# Patient Record
Sex: FEMALE | Race: ASIAN | NOT HISPANIC OR LATINO | Employment: FULL TIME | URBAN - METROPOLITAN AREA
[De-identification: names, ages, dates, MRNs, and addresses within clinical notes are randomized per-mention and may not be internally consistent; named-entity substitution may affect disease eponyms.]

---

## 2020-06-23 ENCOUNTER — TELEPHONE (OUTPATIENT)
Dept: OBGYN CLINIC | Facility: CLINIC | Age: 60
End: 2020-06-23

## 2020-06-24 ENCOUNTER — OFFICE VISIT (OUTPATIENT)
Dept: OBGYN CLINIC | Facility: CLINIC | Age: 60
End: 2020-06-24
Payer: COMMERCIAL

## 2020-06-24 VITALS
TEMPERATURE: 97.6 F | SYSTOLIC BLOOD PRESSURE: 120 MMHG | WEIGHT: 147.6 LBS | HEIGHT: 59 IN | BODY MASS INDEX: 29.76 KG/M2 | DIASTOLIC BLOOD PRESSURE: 70 MMHG

## 2020-06-24 DIAGNOSIS — N94.10 DYSPAREUNIA IN FEMALE: ICD-10-CM

## 2020-06-24 DIAGNOSIS — N85.2 UTERINE ENLARGEMENT: ICD-10-CM

## 2020-06-24 DIAGNOSIS — E58 DIETARY CALCIUM DEFICIENCY: ICD-10-CM

## 2020-06-24 DIAGNOSIS — Z12.4 PAP SMEAR FOR CERVICAL CANCER SCREENING: ICD-10-CM

## 2020-06-24 DIAGNOSIS — Z12.31 VISIT FOR SCREENING MAMMOGRAM: ICD-10-CM

## 2020-06-24 DIAGNOSIS — N95.2 ATROPHIC VAGINITIS: ICD-10-CM

## 2020-06-24 DIAGNOSIS — Z01.419 ENCOUNTER FOR ANNUAL ROUTINE GYNECOLOGICAL EXAMINATION: Primary | ICD-10-CM

## 2020-06-24 DIAGNOSIS — Z12.11 COLON CANCER SCREENING: ICD-10-CM

## 2020-06-24 PROBLEM — E03.9 HYPOTHYROIDISM: Status: ACTIVE | Noted: 2020-06-24

## 2020-06-24 PROBLEM — E78.5 HYPERLIPIDEMIA: Status: ACTIVE | Noted: 2020-06-24

## 2020-06-24 PROBLEM — J30.2 SEASONAL ALLERGIES: Status: ACTIVE | Noted: 2020-06-24

## 2020-06-24 PROCEDURE — S0610 ANNUAL GYNECOLOGICAL EXAMINA: HCPCS | Performed by: OBSTETRICS & GYNECOLOGY

## 2020-06-24 RX ORDER — LORATADINE 10 MG/1
10 TABLET ORAL DAILY
COMMUNITY

## 2020-06-24 RX ORDER — LEVOTHYROXINE SODIUM 0.1 MG/1
100 TABLET ORAL DAILY
COMMUNITY
Start: 2020-03-23

## 2020-07-07 ENCOUNTER — HOSPITAL ENCOUNTER (OUTPATIENT)
Dept: ULTRASOUND IMAGING | Facility: MEDICAL CENTER | Age: 60
Discharge: HOME | End: 2020-07-07
Payer: COMMERCIAL

## 2020-07-07 DIAGNOSIS — N85.2 UTERINE ENLARGEMENT: ICD-10-CM

## 2020-07-07 PROCEDURE — 76856 US EXAM PELVIC COMPLETE: CPT

## 2020-07-07 PROCEDURE — 76830 TRANSVAGINAL US NON-OB: CPT

## 2020-09-22 ENCOUNTER — OFFICE VISIT (OUTPATIENT)
Dept: OBGYN CLINIC | Facility: CLINIC | Age: 60
End: 2020-09-22
Payer: COMMERCIAL

## 2020-09-22 VITALS
HEART RATE: 62 BPM | SYSTOLIC BLOOD PRESSURE: 106 MMHG | WEIGHT: 148 LBS | BODY MASS INDEX: 29.89 KG/M2 | TEMPERATURE: 97.8 F | DIASTOLIC BLOOD PRESSURE: 68 MMHG | OXYGEN SATURATION: 98 %

## 2020-09-22 DIAGNOSIS — N95.2 ATROPHIC VAGINITIS: Primary | ICD-10-CM

## 2020-09-22 DIAGNOSIS — N94.10 DYSPAREUNIA IN FEMALE: ICD-10-CM

## 2020-09-22 PROCEDURE — 99213 OFFICE O/P EST LOW 20 MIN: CPT | Performed by: OBSTETRICS & GYNECOLOGY

## 2020-09-22 RX ORDER — SIMVASTATIN 20 MG
20 TABLET ORAL DAILY
COMMUNITY
Start: 2020-09-16

## 2020-09-22 NOTE — PROGRESS NOTES
Patient is a 60 y o  [id number] with No LMP recorded (lmp unknown)  Patient is postmenopausal  who presents in follow up to initiation of premarin vaginal cream for atrophic vaginitis and dyspareunia  Pt reports she is very happy with the outcome of the medication  She reports intercourse is painless 70% of the time and minimally painful the other 30%  She occasionally forgets to use it, but otherwise is doing well  She denies any vaginal bleeding, abnormal vaginal discharge  She would like to continue therapy  Past Medical History:   Diagnosis Date    Fibroid 2020    noted on u/s 6 cm fundal fibroid and another 2-3 cm intramural fibroid        Past Surgical History:   Procedure Laterality Date     SECTION      x2, 1993, 2001    DILATION AND CURETTAGE, DIAGNOSTIC / THERAPEUTIC      spontaneous ,     WISDOM TOOTH EXTRACTION         OB History    Para Term  AB Living   3 2 2   1 2   SAB TAB Ectopic Multiple Live Births                  # Outcome Date GA Lbr Derek/2nd Weight Sex Delivery Anes PTL Lv   3 AB            2 Term            1 Term               Obstetric Comments   Menarche: 11      Started menopause at 47  Last period   Still experiencing hot flashes  Current Outpatient Medications:     [START ON 2020] conjugated estrogens (PREMARIN) vaginal cream, Insert 0 5 g into the vagina 2 (two) times a week, Disp: 30 g, Rfl: 1    levothyroxine 100 mcg tablet, Take 100 mcg by mouth daily, Disp: , Rfl:     loratadine (CLARITIN) 10 mg tablet, Take 10 mg by mouth daily, Disp: , Rfl:     simvastatin (ZOCOR) 20 mg tablet, Take 20 mg by mouth daily, Disp: , Rfl:     No Known Allergies    Social History     Socioeconomic History    Marital status: /Civil Union     Spouse name: humberto    Number of children: 2    Years of education: Not on file    Highest education level:  Bachelor's degree (e g , BA, AB, BS)   Occupational History    Occupation: special education    Social Needs    Financial resource strain: Not on file    Food insecurity     Worry: Not on file     Inability: Not on file   [employer] needs     Medical: Not on file     Non-medical: Not on file   Tobacco Use    Smoking status: Never Smoker    Smokeless tobacco: Never Used   Substance and Sexual Activity    Alcohol use: Never     Frequency: Never    Drug use: Never    Sexual activity: Yes     Partners: Male     Birth control/protection: Post-menopausal     Comment: lifetime partners: 1,  since [facility] Physical activity     Days per week: Not on file     Minutes per session: Not on file    Stress: Not on file   Relationships    Social connections     Talks on phone: Not on file     Gets together: Not on file     Attends Restorationist service: Not on file     Active member of club or organization: Not on file     Attends meetings of clubs or organizations: Not on file     Relationship status: Not on file    Intimate partner violence     Fear of current or ex partner: Not on file     Emotionally abused: Not on file     Physically abused: Not on file     Forced sexual activity: Not on file   Other Topics Concern    Not on file   Social History Narrative    Cheondoism: Judaism    Accepts blood products        Exercise: Walks 2-3 miles every day    Calcium: half a cup of yogurt a day, 1 cup of milk a week, 1 serving of cheese a week  1 cup of almond milk twice a week  Family History   Problem Relation Age of Onset    Asthma Mother     No Known Problems Sister     No Known Problems Brother     No Known Problems Brother     Heart attack Brother     Lymphoma Brother     No Known Problems Sister     No Known Problems Sister     Ovarian cancer Neg Hx     Colon cancer Neg Hx     Breast cancer Neg Hx        Review of Systems   Constitutional: Negative for chills, fatigue, fever and unexpected weight change     HENT: Negative for congestion, mouth sores and sore throat  Respiratory: Negative for cough, chest tightness, shortness of breath and wheezing  Cardiovascular: Negative for chest pain and palpitations  Gastrointestinal: Negative for abdominal distention, abdominal pain, constipation, diarrhea, nausea and vomiting  Endocrine: Negative for cold intolerance and heat intolerance  Genitourinary: Positive for dyspareunia (markedly improved)  Negative for dysuria, genital sores, menstrual problem, pelvic pain, vaginal bleeding, vaginal discharge and vaginal pain  Musculoskeletal: Negative for arthralgias  Skin: Negative for color change and rash  Neurological: Negative for dizziness, light-headedness and headaches  Hematological: Negative for adenopathy  Blood pressure 106/68, pulse 62, temperature 97 8 °F (36 6 °C), weight 67 1 kg (148 lb), SpO2 98 %, not currently breastfeeding  and Body mass index is 29 89 kg/m²  Physical Exam  Constitutional:       General: She is not in acute distress  Appearance: Normal appearance  She is not ill-appearing  HENT:      Head: Normocephalic and atraumatic  Eyes:      Extraocular Movements: Extraocular movements intact  Conjunctiva/sclera: Conjunctivae normal    Neck:      Musculoskeletal: Normal range of motion  Pulmonary:      Effort: Pulmonary effort is normal    Musculoskeletal: Normal range of motion  General: No tenderness or signs of injury  Skin:     General: Skin is warm  Coloration: Skin is not pale  Findings: No erythema  Neurological:      Mental Status: She is alert and oriented to person, place, and time  Psychiatric:         Mood and Affect: Mood normal          Behavior: Behavior normal          Thought Content:  Thought content normal          Judgment: Judgment normal           vulva: normal external genitalia for age and no lesions, masses, epithelial changes, or exudate  vagina: color pink and rugae  flattening of rugae  cervix: nullip and no lesions   Pt declines bimanual examination      A/P:  Pt is a 60 y o  [id number] with      Diagnoses and all orders for this visit:    Atrophic vaginitis  -     conjugated estrogens (PREMARIN) vaginal cream; Insert 0 5 g into the vagina 2 (two) times a week    Dyspareunia in female  -     conjugated estrogens (PREMARIN) vaginal cream; Insert 0 5 g into the vagina 2 (two) times a week

## 2021-01-25 ENCOUNTER — TELEPHONE (OUTPATIENT)
Dept: OBGYN CLINIC | Facility: CLINIC | Age: 61
End: 2021-01-25

## 2021-01-25 NOTE — TELEPHONE ENCOUNTER
Pt called, wanted to speak to Dr Rodrick Anderson  States that she is having postmenopausal bleeding, comes and goes not heavy  Pt would like to know if she should be concerned and should she make an appointment   Patient was here on 6/24/20 for her annual  Please advise

## 2021-01-26 ENCOUNTER — OFFICE VISIT (OUTPATIENT)
Dept: OBGYN CLINIC | Facility: CLINIC | Age: 61
End: 2021-01-26
Payer: COMMERCIAL

## 2021-01-26 VITALS
WEIGHT: 147 LBS | SYSTOLIC BLOOD PRESSURE: 110 MMHG | DIASTOLIC BLOOD PRESSURE: 72 MMHG | BODY MASS INDEX: 29.69 KG/M2 | HEART RATE: 65 BPM | OXYGEN SATURATION: 99 %

## 2021-01-26 DIAGNOSIS — N95.0 POSTMENOPAUSAL BLEEDING: Primary | ICD-10-CM

## 2021-01-26 PROCEDURE — 58100 BIOPSY OF UTERUS LINING: CPT | Performed by: OBSTETRICS & GYNECOLOGY

## 2021-01-26 NOTE — TELEPHONE ENCOUNTER
I recommend the patient come in for evaluation and also have a pelvic u/s  I will provide her with the order when she comes  It seems that she was added on to the schedule today

## 2021-01-26 NOTE — PROGRESS NOTES
Patient is a 63 y o  [id number] with No LMP recorded (lmp unknown)  Patient is postmenopausal  who presents requesting evaluation of postmenopausal bleeding  Pt reports when she woke up on Saturday she had red blood in her underwear  She put on a liner and 5-6 hours later there was nothing, but a few hours after that she had some on the toilet paper  She has had the same for the  and Monday  Today she continues to have spotting  Pt reports she stopped using premarin cream several months ago  She has started taking Berberine complex, Bromelean and Cinnamon supplement for the last month  She stopped using them when her bleeding started  Past Medical History:   Diagnosis Date    Fibroid 2020    noted on u/s 6 cm fundal fibroid and another 2-3 cm intramural fibroid        Past Surgical History:   Procedure Laterality Date     SECTION      x2, 1993, 2001    DILATION AND CURETTAGE, DIAGNOSTIC / THERAPEUTIC      spontaneous ,     WISDOM TOOTH EXTRACTION         OB History    Para Term  AB Living   3 2 2   1 2   SAB TAB Ectopic Multiple Live Births                  # Outcome Date GA Lbr Derek/2nd Weight Sex Delivery Anes PTL Lv   3 AB            2 Term            1 Term               Obstetric Comments   Menarche: 11      Started menopause at 47  Last period   Still experiencing hot flashes            Current Outpatient Medications:     Barberry-Oreg Grape-Goldenseal (BERBERINE COMPLEX PO), Take by mouth, Disp: , Rfl:     Bromelains (BROMELAIN PO), Take by mouth, Disp: , Rfl:     levothyroxine 100 mcg tablet, Take 100 mcg by mouth daily, Disp: , Rfl:     conjugated estrogens (PREMARIN) vaginal cream, Insert 0 5 g into the vagina 2 (two) times a week (Patient not taking: Reported on 2021), Disp: 30 g, Rfl: 1    loratadine (CLARITIN) 10 mg tablet, Take 10 mg by mouth daily, Disp: , Rfl:     simvastatin (ZOCOR) 20 mg tablet, Take 20 mg by mouth daily, Disp: , Rfl:     No Known Allergies    Social History     Socioeconomic History    Marital status: /Civil Union     Spouse name: humberto    Number of children: 2    Years of education: None    Highest education level: Bachelor's degree ([employer] , BA, AB, BS)   Occupational History    Occupation: special education    Social Needs    Financial resource strain: None    Food insecurity     Worry: None     Inability: None    Transportation needs     Medical: None     Non-medical: None   Tobacco Use    Smoking status: Never Smoker    Smokeless tobacco: Never Used   Substance and Sexual Activity    Alcohol use: Never     Frequency: Never    Drug use: Never    Sexual activity: Yes     Partners: Male     Birth control/protection: Post-menopausal     Comment: lifetime partners: 1,  since [facility] Physical activity     Days per week: None     Minutes per session: None    Stress: None   Relationships    Social connections     Talks on phone: None     Gets together: None     Attends Adventist service: None     Active member of club or organization: None     Attends meetings of clubs or organizations: None     Relationship status: None    Intimate partner violence     Fear of current or ex partner: None     Emotionally abused: None     Physically abused: None     Forced sexual activity: None   Other Topics Concern    None   Social History Narrative    Congregational: Jehovah's witness    Accepts blood products        Exercise: Walks 2-3 miles every day    Calcium: half a cup of yogurt a day, 1 cup of milk a week, 1 serving of cheese a week  1 cup of almond milk twice a week              Family History   Problem Relation Age of Onset    Asthma Mother     No Known Problems Sister     No Known Problems Brother     No Known Problems Brother     Heart attack Brother     Lymphoma Brother     No Known Problems Sister     No Known Problems Sister     Ovarian cancer Neg Hx     Colon cancer Neg Hx     Breast cancer Neg Hx        Review of Systems   Constitutional: Negative for chills, fatigue, fever and unexpected weight change  HENT: Negative for congestion, mouth sores and sore throat  Respiratory: Negative for cough, chest tightness, shortness of breath and wheezing  Cardiovascular: Negative for chest pain and palpitations  Gastrointestinal: Negative for abdominal distention, abdominal pain, constipation, diarrhea, nausea and vomiting  Endocrine: Negative for cold intolerance and heat intolerance  Genitourinary: Positive for vaginal bleeding  Negative for dyspareunia, dysuria, genital sores, menstrual problem, pelvic pain, vaginal discharge and vaginal pain  Musculoskeletal: Negative for arthralgias  Skin: Negative for color change and rash  Neurological: Negative for dizziness, light-headedness and headaches  Hematological: Negative for adenopathy  Blood pressure 110/72, pulse 65, weight 66 7 kg (147 lb), SpO2 99 %, not currently breastfeeding  and Body mass index is 29 69 kg/m²  Physical Exam  Constitutional:       General: She is not in acute distress  Appearance: Normal appearance  She is not ill-appearing  HENT:      Head: Normocephalic and atraumatic  Eyes:      Extraocular Movements: Extraocular movements intact  Conjunctiva/sclera: Conjunctivae normal    Neck:      Musculoskeletal: Normal range of motion  Pulmonary:      Effort: Pulmonary effort is normal    Musculoskeletal: Normal range of motion  Neurological:      Mental Status: She is alert and oriented to person, place, and time  Psychiatric:         Mood and Affect: Mood normal          Behavior: Behavior normal          Thought Content:  Thought content normal          Judgment: Judgment normal           vulva: normal external genitalia for age and no lesions, masses, epithelial changes, or exudate  vagina: color pink, rugae  flattening of rugae and bleeding  without any bleeding  cervix: nullip and no lesions   uterus: NSSC, AF, NT, mobile  adnexa: no masses or tenderness    Pt advised of recommendation for Ebx to rule out hyperplasia or cancer and pelvic u/s to evaluate for polyps/masses  Pt agreeable  Consents reviewed and signed    Endometrial biopsy    Date/Time: 2021 12:30 PM  Performed by: Yael Santiago MD  Authorized by: Yael Santiago MD   Universal Protocol:  Consent: Verbal consent obtained  Written consent obtained  Risks and benefits: risks, benefits and alternatives were discussed  Consent given by: patient  Time out: Immediately prior to procedure a "time out" was called to verify the correct patient, procedure, equipment, support staff and site/side marked as required  Patient understanding: patient states understanding of the procedure being performed  Patient consent: the patient's understanding of the procedure matches consent given  Procedure consent: procedure consent matches procedure scheduled  Patient identity confirmed: verbally with patient      Indication:     Indications: Post-menopausal bleeding      Chronicity of post-menopausal bleeding:  New    Progression of post-menopausal bleeding:  Unchanged  Procedure:     Procedure: endometrial biopsy with Pipelle      A bivalve speculum was placed in the vagina: yes      Cervix cleaned and prepped: yes      The cervix was dilated: no      Uterus sounded: yes      Uterus sound depth (cm):  9    Specimen collected: specimen collected and sent to pathology      Patient tolerated procedure well with no complications: yes    Findings:     Uterus size:  6-8 weeks    Cervix: normal      Adnexa: normal          A/P:  Pt is a 63 y o   with      [place] was seen today for vaginal bleeding      Diagnoses and all orders for this visit:    Postmenopausal bleeding  -     Endometrial biopsy  -     Tissue Exam

## 2021-01-29 ENCOUNTER — ULTRASOUND (OUTPATIENT)
Dept: OBGYN CLINIC | Facility: CLINIC | Age: 61
End: 2021-01-29
Payer: COMMERCIAL

## 2021-01-29 DIAGNOSIS — N95.0 PMB (POSTMENOPAUSAL BLEEDING): Primary | ICD-10-CM

## 2021-01-29 PROCEDURE — 76856 US EXAM PELVIC COMPLETE: CPT | Performed by: OBSTETRICS & GYNECOLOGY

## 2021-01-29 NOTE — PROGRESS NOTES
AMB US Pelvic Non OB    Date/Time: 1/29/2021 8:30 AM  Performed by: Hardy Ko  Authorized by: Sly Gomez MD     Procedure details:     Indications: non-obstetric vaginal bleeding      Indications comment:  PM Bleed    Technique:  US Pelvic, Non-OB with complete exam  Uterine findings:     Length (cm): 9 9    Height (cm):  7 3    Width (cm):  8 5    Uterine adhesions: not identified      Adnexal mass: not identified      Polyps: not identified      Myomas: identified      Endometrial stripe: identified      Endometrial hyperplasia: not identified      Endometrium thickness (mm):  15  Left ovary findings:     Left ovary:  Visualized    Cysts: not identified      Length (cm): 3 2    Height (cm): 3 3    Width (cm): 1 9  Right ovary findings:     Right ovary:  Visualized    Cysts: not identified      Length (cm): 3 1    Height (cm): 3    Width (cm): 3  Other findings:     Free pelvic fluid: not identified      Free peritoneal fluid: not identified    Post-Procedure Details:     Impression:  Endo-15mm, Fibroids-ANT-32 x 33 x 32 mm, POST-33 x 32 x 30 mm    Tolerance:   Tolerated well, no immediate complications

## 2021-02-04 ENCOUNTER — TELEPHONE (OUTPATIENT)
Dept: OBGYN CLINIC | Facility: CLINIC | Age: 61
End: 2021-02-04

## 2021-02-04 NOTE — TELEPHONE ENCOUNTER
I called patient to review her pathology and pelvic u/s results but I got a voicemail  Advised pt to call back to review results

## 2021-02-04 NOTE — TELEPHONE ENCOUNTER
Patient called for the results of her biopsy and ultrasound  Patient can be reached at [phone]  Patients biopsy results are under media

## 2021-02-05 NOTE — TELEPHONE ENCOUNTER
I called the patient back and reviewed with her that her ultrasound shows multiple fibroids which are known from previous ultrasounds, but her endometrium is thickened measuring 15 mm  I also reviewed that her previous Ebx showed scant endometrium with blood  Advised repeat ebx vs D&C  Pt reports her bleeding is stopped and she would like to try a repeat ebx  Office to call pt and schedule

## 2021-02-09 ENCOUNTER — PROCEDURE VISIT (OUTPATIENT)
Dept: OBGYN CLINIC | Facility: CLINIC | Age: 61
End: 2021-02-09
Payer: COMMERCIAL

## 2021-02-09 VITALS
HEART RATE: 66 BPM | BODY MASS INDEX: 29.37 KG/M2 | DIASTOLIC BLOOD PRESSURE: 62 MMHG | SYSTOLIC BLOOD PRESSURE: 102 MMHG | OXYGEN SATURATION: 98 % | WEIGHT: 145.4 LBS

## 2021-02-09 DIAGNOSIS — N95.0 PMB (POSTMENOPAUSAL BLEEDING): Primary | ICD-10-CM

## 2021-02-09 PROCEDURE — 88305 TISSUE EXAM BY PATHOLOGIST: CPT | Performed by: PATHOLOGY

## 2021-02-09 PROCEDURE — 58100 BIOPSY OF UTERUS LINING: CPT | Performed by: OBSTETRICS & GYNECOLOGY

## 2021-02-09 NOTE — PROGRESS NOTES
Endometrial biopsy    Date/Time: 2/9/2021 2:21 PM  Performed by: Christ Hurst MD  Authorized by: Christ Hurst MD   Universal Protocol:  Consent: Verbal consent obtained  Written consent obtained  Risks and benefits: risks, benefits and alternatives were discussed  Consent given by: patient  Time out: Immediately prior to procedure a "time out" was called to verify the correct patient, procedure, equipment, support staff and site/side marked as required    Patient understanding: patient states understanding of the procedure being performed  Patient consent: the patient's understanding of the procedure matches consent given  Procedure consent: procedure consent matches procedure scheduled  Relevant documents: relevant documents present and verified  Patient identity confirmed: verbally with patient      Indication:     Indications: Post-menopausal bleeding      Chronicity of post-menopausal bleeding:  New    Progression of post-menopausal bleeding:  Resolved  Procedure:     Procedure: endometrial biopsy with Pipelle      A bivalve speculum was placed in the vagina: yes      Cervix cleaned and prepped: yes      The cervix was dilated: no      Uterus sounded: yes      Uterus sound depth (cm):  10    Specimen collected: specimen collected and sent to pathology      Patient tolerated procedure well with no complications: yes    Findings:     Uterus size:  9-10 weeks    Cervix: normal      Adnexa: normal

## 2021-02-16 ENCOUNTER — TELEPHONE (OUTPATIENT)
Dept: OBGYN CLINIC | Facility: CLINIC | Age: 61
End: 2021-02-16

## 2021-02-16 NOTE — TELEPHONE ENCOUNTER
LMOM informing pt that she is scheduled for EBX w/Dr Estrella Morgan for next month and if that date/time did not work for her to rico the office back to reschedule

## 2021-02-16 NOTE — TELEPHONE ENCOUNTER
Pt states she already had an EBX 2 times and is waiting for a return phone call from Dr Dinora Carter for her results  Pt was informed provider is out of the office until Friday  Pt was ok with that and wants to have her call her back on Friday

## 2021-02-19 NOTE — TELEPHONE ENCOUNTER
I called the patient and reviewed her endometrial biopsy results which were normal--inactive endometrium without evidence of hyperplasia or malignancy  Reviewed that her results also show possible abnormal cervical cells vs metaplastic changes  reivewed with patient her pap smear <1 year ago was wnl  Offered repeat pap if she is concerned, Pt reports she is not and will observe at this time

## 2021-03-26 DIAGNOSIS — Z23 ENCOUNTER FOR IMMUNIZATION: ICD-10-CM

## 2023-05-17 ENCOUNTER — OFFICE VISIT (OUTPATIENT)
Dept: NEUROSURGERY | Facility: CLINIC | Age: 63
End: 2023-05-17

## 2023-05-17 VITALS
OXYGEN SATURATION: 99 % | DIASTOLIC BLOOD PRESSURE: 70 MMHG | TEMPERATURE: 98.2 F | BODY MASS INDEX: 27.29 KG/M2 | WEIGHT: 139 LBS | HEART RATE: 79 BPM | SYSTOLIC BLOOD PRESSURE: 114 MMHG | RESPIRATION RATE: 16 BRPM | HEIGHT: 60 IN

## 2023-05-17 DIAGNOSIS — M51.26 RECURRENT DISPLACEMENT OF LUMBAR DISC: ICD-10-CM

## 2023-05-17 DIAGNOSIS — M54.50 LOWER BACK PAIN: ICD-10-CM

## 2023-05-17 DIAGNOSIS — M54.16 LUMBAR RADICULOPATHY: Primary | ICD-10-CM

## 2023-05-17 RX ORDER — LEVOTHYROXINE SODIUM 88 UG/1
88 TABLET ORAL DAILY
COMMUNITY
Start: 2023-05-03

## 2023-05-17 RX ORDER — ERGOCALCIFEROL 1.25 MG/1
50000 CAPSULE ORAL WEEKLY
COMMUNITY
Start: 2023-04-17

## 2023-05-17 RX ORDER — FEXOFENADINE HCL 180 MG/1
TABLET ORAL AS NEEDED
COMMUNITY

## 2023-05-17 RX ORDER — LIDOCAINE 50 MG/G
PATCH TOPICAL AS NEEDED
COMMUNITY

## 2023-05-17 RX ORDER — METHOCARBAMOL 750 MG/1
TABLET, FILM COATED ORAL
COMMUNITY
Start: 2023-05-05

## 2023-05-17 NOTE — PROGRESS NOTES
Office Note - Neurosurgery   Gaudencio Sanchez 60 y o  female MRN: [id number]      Assessment:    Patient is gradually improving  58-year-old woman with right lumbar radiculopathy secondary recurrent L5-S1 disc herniation  Fortunately she has had some improvement with recent epidural steroid injection  She could consider a second injection  She will continue to work with physical therapy  She will follow-up with her pain specialist     If nonsurgical pain management strategies are not effective in managing her pain, then a revision right L5-S1 minimally invasive microdiscectomy would be a reasonable option though we would also discuss possible minimally invasive instrumented fixation fusion to prevent further recurrence  At present she would prefer to exhaust nonsurgical pain management strategies which is quite reasonable  Reviewed the signs and symptoms of progressive lumbar radiculopathy and asked her to contact my office any concerns arise  Otherwise, she will follow-up on a as needed basis  History, physical examination and diagnostic tests were reviewed and questions answered  Diagnosis, care plan and treatment options were discussed  The patient understand instructions and will follow up as directed  Plan:    Follow-up: prn    Problem List Items Addressed This Visit        Musculoskeletal and Integument    Recurrent displacement of lumbar disc   Other Visit Diagnoses     Lumbar radiculopathy    -  Primary    Lower back pain              Subjective/Objective     Chief Complaint    Right leg pain  HPI    Pleasant 58-year-old woman who previously underwent lumbar decompression surgery with another surgeon approximately 2 years ago for lower back pain and some right leg pain  She was doing well until approximately 6 weeks ago when she developed severe pain rating down her right leg and into the top of her foot with associated numbness though she denies any weakness    She denies any pain, weakness or numbness in her left leg or difficulties with bowel bladder function or change in perineal sensation  Fortunately her pain improved with an epidural steroid injection though it still is quite bothersome  She is working with physical therapy  Current pain medications are listed below  She presents today for surgical consultation  GORAN ZIMMER personally reviewed and updated  Review of Systems   Gastrointestinal: Negative  Genitourinary: Negative  Musculoskeletal: Positive for back pain (low back pain, radiates to right buttock and posterior leg), gait problem (painful to walk), joint swelling (right ankle) and myalgias (right lower leg at ankle and foot)  Painful to sit for too long    PT may help sometimes  L4-5 ILESI 4/11/23 helped with sciatic pain was more than 10/10, now 5/10   Neurological: Positive for weakness (right leg) and numbness (right foot, intermittent)  Burning pain right 2nd toe, intermittent   Psychiatric/Behavioral: Positive for sleep disturbance  Family History    Family History   Problem Relation Age of Onset   • Asthma Mother    • No Known Problems Sister    • No Known Problems Brother    • No Known Problems Brother    • Heart attack Brother    • Lymphoma Brother    • No Known Problems Sister    • No Known Problems Sister    • No Known Problems Daughter    • No Known Problems Son    • Ovarian cancer Neg Hx    • Colon cancer Neg Hx    • Breast cancer Neg Hx        Social History    Social History     Socioeconomic History   • Marital status: /Civil Union     Spouse name: humberto   • Number of children: 2   • Years of education: Not on file   • Highest education level:  Bachelor's degree (e g , BA, AB, BS)   Occupational History   • Occupation: special education    Tobacco Use   • Smoking status: Never   • Smokeless tobacco: Never   Vaping Use   • Vaping Use: Never used   Substance and Sexual Activity   • Alcohol use: Never   • Drug use: Never   • Sexual activity: Yes     Partners: Male     Birth control/protection: Post-menopausal     Comment: lifetime partners: 1,  since    Other Topics Concern   • Not on file   Social History Narrative    Yazdanism: Baptism    Accepts blood products        Exercise: Walks 2-3 miles every day    Calcium: half a cup of yogurt a day, 1 cup of milk a week, 1 serving of cheese a week  1 cup of almond milk twice a week            Social Determinants of Health     Financial Resource Strain: Not on file   Food Insecurity: Not on file   Transportation Needs: Not on file   Physical Activity: Not on file   Stress: Not on file   Social Connections: Not on file   Intimate Partner Violence: Not on file   Housing Stability: Not on file       Past Medical History    Past Medical History:   Diagnosis Date   • Fibroid 2020    noted on u/s 6 cm fundal fibroid and another 2-3 cm intramural fibroid        Surgical History    Past Surgical History:   Procedure Laterality Date   •  SECTION      x2, 1993, 2001   • DILATION AND CURETTAGE, DIAGNOSTIC / THERAPEUTIC      spontaneous ,    • LUMBAR DISCECTOMY  2021   • WISDOM TOOTH EXTRACTION         Medications      Current Outpatient Medications:   •  Diclofenac Sodium (VOLTAREN) 1 %, as needed, Disp: , Rfl:   •  ergocalciferol (VITAMIN D2) 50,000 units, Take 50,000 Units by mouth once a week, Disp: , Rfl:   •  fexofenadine (ALLEGRA) 180 MG tablet, if needed, Disp: , Rfl:   •  levothyroxine 100 mcg tablet, Take 100 mcg by mouth daily, Disp: , Rfl:   •  levothyroxine 88 mcg tablet, Take 88 mcg by mouth daily, Disp: , Rfl:   •  lidocaine (LIDODERM) 5 %, if needed, Disp: , Rfl:   •  methocarbamol (ROBAXIN) 750 mg tablet, methocarbamol 750 mg tablet  TAKE 1 TABLET BY MOUTH THREE TIMES DAILY AS NEEDED FOR MUSCLE SPASM, Disp: , Rfl:   •  Barberry-Oreg Grape-Goldenseal (BERBERINE COMPLEX PO), Take by mouth (Patient not taking: Reported on 2023), Disp: , Rfl:   • Bromelains (BROMELAIN PO), Take by mouth (Patient not taking: Reported on 5/17/2023), Disp: , Rfl:   •  conjugated estrogens (PREMARIN) vaginal cream, Insert 0 5 g into the vagina 2 (two) times a week (Patient not taking: Reported on 2/9/2021), Disp: 30 g, Rfl: 1  •  loratadine (CLARITIN) 10 mg tablet, Take 10 mg by mouth daily (Patient not taking: Reported on 5/17/2023), Disp: , Rfl:   •  simvastatin (ZOCOR) 20 mg tablet, Take 20 mg by mouth daily (Patient not taking: Reported on 5/17/2023), Disp: , Rfl:     Allergies    No Known Allergies    The following portions of the patient's history were reviewed and updated as appropriate: allergies, current medications, past family history, past medical history, past social history, past surgical history and problem list     Investigations    I personally reviewed the MRI and XRAY results with the patient:    Plain film lumbar spine dated March 30, 2023  Normal lumbar lordosis  Mild degenerative changes  Note is made of a partially lumbarized S1 vertebral body  MRI of the lumbar spine with and without contrast dated March 30, 2023  Normal lumbar lordosis  Mild degenerative changes throughout the lumbar spine  Previous right-sided laminotomy at L5-S1  There appears to be a recurrent right-sided disc herniation with some surrounding epidural scar tissue which is enhancing  There is compression of the traversing nerve root  No other areas of significant neural compression  Intrathecal contents unremarkable  Physical Exam    Vitals:  Blood pressure 114/70, pulse 79, temperature 98 2 °F (36 8 °C), temperature source Tympanic, resp  rate 16, height 5' (1 524 m), weight 63 kg (139 lb), SpO2 99 %, not currently breastfeeding  ,Body mass index is 27 15 kg/m²  Physical Exam  Vitals reviewed  Constitutional:       General: She is not in acute distress  Eyes:      Extraocular Movements: Extraocular movements intact     Pulmonary:      Effort: Pulmonary effort is normal  No respiratory distress  Skin:     General: Skin is warm and dry  Comments: Midline lumbar incision well-healed  Neurological:      Mental Status: She is alert and oriented to person, place, and time  Sensory: No sensory deficit  Motor: Weakness present  Gait: Gait abnormal       Comments: Walks with antalgic gait favoring the right leg   5-/5 power on right dorsiflexion, plantarflexion and EHL  Psychiatric:         Mood and Affect: Mood normal          Behavior: Behavior normal        Neurologic Exam     Mental Status   Oriented to person, place, and time

## 2023-10-27 NOTE — PROGRESS NOTES
Christal Vega is a 60 y.o. female who presents for annual well woman exam.     GYN:  Denies vaginal discharge, labial erythema or lesions. Reports vaginal dryness, which she has experienced in the past, which causes her to have dyspareunia   She denies any postmenopausal bleeding  Patient is sexually active  Last pap smear was in 2020. Results were NILM/HPV neg. OB:  [id number] female  Csx2, D&C for SAB. :  Denies dysuria, urinary frequency or urgency. Denies hematuria, flank pain, incontinence. Breast:  Denies breast mass, skin changes, dimpling, reddening, nipple retraction. Denies breast discharge. Last mammogram was in September 2023. Results were negative. Patient does not have a family history of breast, endometrial, or ovarian ca. General:  Diet: Reviewed dietary calcium recommendations  Exercise: Reviewed recommendation 150 min moderate intensity exercise per week  ETOH use: no  Tobacco use: no  Recreational drug use: no    Screening:  Cervical cancer: last pap smear in 2020. Results were NILM/HPV neg. Breast cancer: last mammogram in September 2023. Results were negative. Colon cancer: never had colonoscopy, referral provided    Review of Systems  Pertinent items are noted in HPI. Objective      /74 (BP Location: Left arm, Patient Position: Sitting, Cuff Size: Standard)   Ht 5' (1.524 m)   Wt 64.9 kg (143 lb)   LMP  (LMP Unknown)   BMI 27.93 kg/m²     Physical Exam  Constitutional:       Appearance: Normal appearance. HENT:      Head: Normocephalic and atraumatic. Cardiovascular:      Rate and Rhythm: Normal rate. Pulmonary:      Effort: Pulmonary effort is normal. No respiratory distress. Chest:   Breasts:     Right: Normal. No inverted nipple, mass or tenderness. Left: Normal. No inverted nipple, mass or tenderness. Abdominal:      Palpations: Abdomen is soft. Tenderness: There is no abdominal tenderness.    Genitourinary:     General: Normal vulva. Vagina: No vaginal discharge. Comments: Uterus normal in size and contour, no adnexal masses palpated  Neurological:      Mental Status: She is alert.                  Assessment     59 yo  presents today for her annual exam     Plan   - Next Pap smear due   - Allergy meds refilled as patient states she will likely develop symptoms as the seasons change  - Premarin cream refilled for vaginal dryness  - Referral to GI provided for colonoscopy

## 2023-10-30 ENCOUNTER — ANNUAL EXAM (OUTPATIENT)
Dept: OBGYN CLINIC | Facility: CLINIC | Age: 63
End: 2023-10-30

## 2023-10-30 VITALS
BODY MASS INDEX: 28.07 KG/M2 | SYSTOLIC BLOOD PRESSURE: 126 MMHG | WEIGHT: 143 LBS | HEIGHT: 60 IN | DIASTOLIC BLOOD PRESSURE: 74 MMHG

## 2023-10-30 DIAGNOSIS — Z12.11 COLON CANCER SCREENING: ICD-10-CM

## 2023-10-30 DIAGNOSIS — Z01.419 WOMEN'S ANNUAL ROUTINE GYNECOLOGICAL EXAMINATION: Primary | ICD-10-CM

## 2023-10-30 DIAGNOSIS — N94.10 DYSPAREUNIA IN FEMALE: ICD-10-CM

## 2023-10-30 DIAGNOSIS — N95.2 ATROPHIC VAGINITIS: ICD-10-CM

## 2023-10-30 RX ORDER — MONTELUKAST SODIUM 10 MG/1
10 TABLET ORAL
Qty: 30 TABLET | Refills: 0 | Status: SHIPPED | OUTPATIENT
Start: 2023-10-30 | End: 2023-11-29

## 2023-10-30 RX ORDER — CONJUGATED ESTROGENS 0.62 MG/G
0.5 CREAM VAGINAL 2 TIMES WEEKLY
Qty: 30 G | Refills: 3 | Status: SHIPPED | OUTPATIENT
Start: 2023-10-30

## 2023-10-30 RX ORDER — CHLORHEXIDINE GLUCONATE ORAL RINSE 1.2 MG/ML
15 SOLUTION DENTAL 2 TIMES DAILY
Qty: 120 ML | Refills: 0 | Status: SHIPPED | OUTPATIENT
Start: 2023-10-30

## 2023-10-30 RX ORDER — OMEGA-3S/DHA/EPA/FISH OIL/D3 300MG-1000
400 CAPSULE ORAL DAILY
COMMUNITY

## 2023-11-02 ENCOUNTER — TELEPHONE (OUTPATIENT)
Dept: OBGYN CLINIC | Facility: CLINIC | Age: 63
End: 2023-11-02

## 2023-11-02 ENCOUNTER — PREP FOR PROCEDURE (OUTPATIENT)
Age: 63
End: 2023-11-02

## 2023-11-02 ENCOUNTER — TELEPHONE (OUTPATIENT)
Age: 63
End: 2023-11-02

## 2023-11-02 DIAGNOSIS — Z12.11 SCREENING FOR COLON CANCER: Primary | ICD-10-CM

## 2023-11-02 DIAGNOSIS — N95.2 ATROPHIC VAGINITIS: Primary | ICD-10-CM

## 2023-11-02 RX ORDER — ESTRADIOL 0.1 MG/G
2 CREAM VAGINAL 2 TIMES WEEKLY
Qty: 42.5 G | Refills: 3 | Status: SHIPPED | OUTPATIENT
Start: 2023-11-02

## 2023-11-02 NOTE — TELEPHONE ENCOUNTER
Pt called stating that the vaginal cream that you sent her (Premarin) from her visit on Monday is very expensive at her pharmacy, and wants to know if there is a cheaper, generic brand she can be prescribed. Please advise.

## 2023-11-02 NOTE — TELEPHONE ENCOUNTER
Scheduled date of colonoscopy (as of today):12-   Physician performing colonoscopy: Dr. Padma Martinez  Location of colonoscopy: [facility]   Bowel prep reviewed with patient: Goran Jacob reviewed and sent via [employer]   Instructions reviewed with patient by:reviewed and sent via [employer]   Clearances: n/a

## 2023-11-27 ENCOUNTER — ANESTHESIA EVENT (OUTPATIENT)
Dept: ANESTHESIOLOGY | Facility: HOSPITAL | Age: 63
End: 2023-11-27

## 2023-11-27 ENCOUNTER — TELEPHONE (OUTPATIENT)
Age: 63
End: 2023-11-27

## 2023-11-27 ENCOUNTER — ANESTHESIA (OUTPATIENT)
Dept: ANESTHESIOLOGY | Facility: HOSPITAL | Age: 63
End: 2023-11-27

## 2023-11-27 NOTE — TELEPHONE ENCOUNTER
Scheduled date of colonoscopy (as of today):12/04/2023  Physician performing colonoscopy:Dr Lynell Phalen  Location of colonoscopy:[facility]   Bowel prep reviewed with patient:miralax/dul  Instructions reviewed with patient by:sent via my chart   Clearances: n/a

## 2023-12-01 RX ORDER — SODIUM CHLORIDE 9 MG/ML
125 INJECTION, SOLUTION INTRAVENOUS CONTINUOUS
Status: CANCELLED | OUTPATIENT
Start: 2023-12-01

## 2023-12-01 RX ORDER — FENTANYL CITRATE/PF 50 MCG/ML
25 SYRINGE (ML) INJECTION
Status: CANCELLED | OUTPATIENT
Start: 2023-12-01

## 2023-12-01 RX ORDER — ONDANSETRON 2 MG/ML
4 INJECTION INTRAMUSCULAR; INTRAVENOUS ONCE AS NEEDED
Status: CANCELLED | OUTPATIENT
Start: 2023-12-01

## 2023-12-04 ENCOUNTER — ANESTHESIA EVENT (OUTPATIENT)
Dept: GASTROENTEROLOGY | Facility: MEDICAL CENTER | Age: 63
End: 2023-12-04

## 2023-12-04 ENCOUNTER — HOSPITAL ENCOUNTER (OUTPATIENT)
Dept: GASTROENTEROLOGY | Facility: MEDICAL CENTER | Age: 63
Setting detail: OUTPATIENT SURGERY
Discharge: HOME | End: 2023-12-04
Payer: COMMERCIAL

## 2023-12-04 ENCOUNTER — ANESTHESIA (OUTPATIENT)
Dept: GASTROENTEROLOGY | Facility: MEDICAL CENTER | Age: 63
End: 2023-12-04

## 2023-12-04 VITALS
SYSTOLIC BLOOD PRESSURE: 91 MMHG | TEMPERATURE: 98.5 F | OXYGEN SATURATION: 100 % | HEART RATE: 61 BPM | DIASTOLIC BLOOD PRESSURE: 56 MMHG | RESPIRATION RATE: 18 BRPM

## 2023-12-04 DIAGNOSIS — Z12.11 SCREENING FOR COLON CANCER: ICD-10-CM

## 2023-12-04 PROCEDURE — 88305 TISSUE EXAM BY PATHOLOGIST: CPT | Performed by: PATHOLOGY

## 2023-12-04 PROCEDURE — 45385 COLONOSCOPY W/LESION REMOVAL: CPT | Performed by: INTERNAL MEDICINE

## 2023-12-04 RX ORDER — PROPOFOL 10 MG/ML
INJECTION, EMULSION INTRAVENOUS AS NEEDED
Status: DISCONTINUED | OUTPATIENT
Start: 2023-12-04 | End: 2023-12-04

## 2023-12-04 RX ORDER — SODIUM CHLORIDE 9 MG/ML
125 INJECTION, SOLUTION INTRAVENOUS CONTINUOUS
Status: DISCONTINUED | OUTPATIENT
Start: 2023-12-04 | End: 2023-12-08 | Stop reason: HOSPADM

## 2023-12-04 RX ORDER — FENTANYL CITRATE/PF 50 MCG/ML
25 SYRINGE (ML) INJECTION
Status: DISCONTINUED | OUTPATIENT
Start: 2023-12-04 | End: 2023-12-08 | Stop reason: HOSPADM

## 2023-12-04 RX ORDER — ONDANSETRON 2 MG/ML
4 INJECTION INTRAMUSCULAR; INTRAVENOUS ONCE AS NEEDED
Status: DISCONTINUED | OUTPATIENT
Start: 2023-12-04 | End: 2023-12-08 | Stop reason: HOSPADM

## 2023-12-04 RX ADMIN — PROPOFOL 30 MG: 10 INJECTION, EMULSION INTRAVENOUS at 11:35

## 2023-12-04 RX ADMIN — PROPOFOL 30 MG: 10 INJECTION, EMULSION INTRAVENOUS at 11:27

## 2023-12-04 RX ADMIN — PROPOFOL 110 MG: 10 INJECTION, EMULSION INTRAVENOUS at 11:25

## 2023-12-04 RX ADMIN — PROPOFOL 30 MG: 10 INJECTION, EMULSION INTRAVENOUS at 11:29

## 2023-12-04 RX ADMIN — PROPOFOL 30 MG: 10 INJECTION, EMULSION INTRAVENOUS at 11:32

## 2023-12-04 RX ADMIN — SODIUM CHLORIDE 125 ML/HR: 0.9 INJECTION, SOLUTION INTRAVENOUS at 11:16

## 2023-12-04 NOTE — ANESTHESIA POSTPROCEDURE EVALUATION
Post-Op Assessment Note    CV Status:  Stable    Pain management: adequate       Mental Status:  Alert and awake   Hydration Status:  Euvolemic   PONV Controlled:  Controlled   Airway Patency:  Patent     Post Op Vitals Reviewed: Yes    No anethesia notable event occurred.     Staff: Anesthesiologist               BP      Temp      Pulse     Resp      SpO2      BP 91/56   Pulse 61   Temp 98.5 °F (36.9 °C) (Temporal)   Resp 18   LMP  (LMP Unknown)   SpO2 100%

## 2023-12-04 NOTE — H&P
History and Physical -  [facility]  Larry Cortes 58 y.o. female MRN: [id number]                  HPI: Larry Cortes is a 58y.o. year old female who presents for screening colonoscopy      REVIEW OF SYSTEMS: Per the HPI, and otherwise unremarkable.     Historical Information   Past Medical History:   Diagnosis Date    Disease of thyroid gland     Fibroid 2020    noted on u/s 6 cm fundal fibroid and another 2-3 cm intramural fibroid      Past Surgical History:   Procedure Laterality Date     SECTION      x2, 1993, 2001    DILATION AND CURETTAGE, DIAGNOSTIC / THERAPEUTIC      spontaneous ,     LUMBAR DISCECTOMY  2021    WISDOM TOOTH EXTRACTION       Social History   Social History     Substance and Sexual Activity   Alcohol Use Never     Social History     Substance and Sexual Activity   Drug Use Never     Social History     Tobacco Use   Smoking Status Never   Smokeless Tobacco Never     Family History   Problem Relation Age of Onset    Asthma Mother     No Known Problems Sister     No Known Problems Brother     No Known Problems Brother     Heart attack Brother     Lymphoma Brother     No Known Problems Sister     No Known Problems Sister     No Known Problems Daughter     No Known Problems Son     Ovarian cancer Neg Hx     Colon cancer Neg Hx     Breast cancer Neg Hx        Meds/Allergies       Current Outpatient Medications:     levothyroxine 88 mcg tablet    Barberry-Oreg Grape-Goldenseal (BERBERINE COMPLEX PO)    Bromelains (BROMELAIN PO)    chlorhexidine (PERIDEX) 0.12 % solution    cholecalciferol (VITAMIN D3) 400 units tablet    Diclofenac Sodium (VOLTAREN) 1 %    ergocalciferol (VITAMIN D2) 50,000 units    estradiol (ESTRACE VAGINAL) 0.1 mg/g vaginal cream    estrogens, conjugated (Premarin) vaginal cream    fexofenadine (ALLEGRA) 180 MG tablet    levothyroxine 100 mcg tablet    lidocaine (LIDODERM) 5 %    loratadine (CLARITIN) 10 mg tablet methocarbamol (ROBAXIN) 750 mg tablet    montelukast (SINGULAIR) 10 mg tablet    simvastatin (ZOCOR) 20 mg tablet    Current Facility-Administered Medications:     sodium chloride 0.9 % infusion, 125 mL/hr, Intravenous, Continuous    No Known Allergies    Objective     /59   Pulse 73   Temp 98.5 °F (36.9 °C) (Temporal)   Resp 14   LMP  (LMP Unknown)   SpO2 100%       PHYSICAL EXAM    Gen: NAD  Head: NCAT  CV: RRR  CHEST: Clear  ABD: soft, NT/ND  EXT: no edema      ASSESSMENT/PLAN:  This is a 58y.o. year old female here for colonoscopy, and she is stable and optimized for her procedure.

## 2023-12-04 NOTE — ANESTHESIA PREPROCEDURE EVALUATION
Procedure:  COLONOSCOPY    Relevant Problems   CARDIO   (+) Hyperlipidemia      ENDO   (+) Hypothyroidism        Physical Exam    Airway    Mallampati score: III  TM Distance: >3 FB  Neck ROM: full     Dental       Cardiovascular  Rhythm: regular    Pulmonary   Breath sounds clear to auscultation    Other Findings  post-pubertal.      Anesthesia Plan  ASA Score- 2     Anesthesia Type- IV sedation with anesthesia with ASA Monitors. Additional Monitors:     Airway Plan:            Plan Factors-    Chart reviewed. Existing labs reviewed. Induction- intravenous. Postoperative Plan-     Informed Consent- Anesthetic plan and risks discussed with patient.

## 2023-12-07 PROCEDURE — 88305 TISSUE EXAM BY PATHOLOGIST: CPT | Performed by: PATHOLOGY

## 2023-12-11 ENCOUNTER — HOSPITAL ENCOUNTER (OUTPATIENT)
Dept: PULMONOLOGY | Facility: HOSPITAL | Age: 63
Discharge: HOME | End: 2023-12-11
Payer: COMMERCIAL

## 2023-12-11 DIAGNOSIS — J45.909 UNSPECIFIED ASTHMA, UNCOMPLICATED: ICD-10-CM

## 2023-12-11 PROCEDURE — 94729 DIFFUSING CAPACITY: CPT | Performed by: INTERNAL MEDICINE

## 2023-12-11 PROCEDURE — 94726 PLETHYSMOGRAPHY LUNG VOLUMES: CPT | Performed by: INTERNAL MEDICINE

## 2023-12-11 PROCEDURE — 94060 EVALUATION OF WHEEZING: CPT

## 2023-12-11 PROCEDURE — 94726 PLETHYSMOGRAPHY LUNG VOLUMES: CPT

## 2023-12-11 PROCEDURE — 94760 N-INVAS EAR/PLS OXIMETRY 1: CPT

## 2023-12-11 PROCEDURE — 94060 EVALUATION OF WHEEZING: CPT | Performed by: INTERNAL MEDICINE

## 2023-12-11 PROCEDURE — 94729 DIFFUSING CAPACITY: CPT

## 2023-12-11 RX ORDER — ALBUTEROL SULFATE 2.5 MG/3ML
2.5 SOLUTION RESPIRATORY (INHALATION) ONCE
Status: COMPLETED | OUTPATIENT
Start: 2023-12-11 | End: 2023-12-11

## 2023-12-11 RX ADMIN — ALBUTEROL SULFATE 2.5 MG: 2.5 SOLUTION RESPIRATORY (INHALATION) at 16:44

## 2025-07-07 NOTE — DISCHARGE INSTRUCTIONS
Colonoscopy   WHAT YOU NEED TO KNOW:   A colonoscopy is a procedure to examine the inside of your colon (intestine) with a scope. Polyps or tissue growths may have been removed during your colonoscopy. It is normal to feel bloated and to have some abdominal discomfort. You should be passing gas. If you have hemorrhoids or you had polyps removed, you may have a small amount of bleeding. DISCHARGE INSTRUCTIONS:   Seek care immediately if:   You have sudden, severe abdominal pain. You have problems swallowing. You have a large amount of black, sticky bowel movements or blood in your bowel movements. You have sudden trouble breathing. You feel weak, lightheaded, or faint or your heart beats faster than normal for you. Contact your healthcare provider if:   You have a fever and chills. You have nausea or are vomiting. Your abdomen is bloated or feels full and hard. You have abdominal pain. You have black, sticky bowel movements or blood in your bowel movements. You have not had a bowel movement for 3 days after your procedure. You have rash or hives. You have questions or concerns about your procedure. Activity:   Do not lift, strain, or run for 24 hours after your procedure. Rest after your procedure. You have been given medicine to relax you. Do not drive or make important decisions until the day after your procedure. Return to your normal activity as directed. Relieve gas and discomfort from bloating by lying on your right side with a heating pad on your abdomen. You may need to take short walks to help the gas move out. Eat small meals until bloating is relieved. Follow up with your healthcare provider as directed: Write down your questions so you remember to ask them during your visits. If you take a “blood thinner”, please review the specific instructions from your endoscopist about when you should resume it.  These can be found in the “Recommendation” Migdalia Sandoval, APRN - CNP  [facility] Practice Staff1 minute ago (9:41 AM)       Sorry for my mistake, I sent Levothyroxine 88 mcg daily to the [facility].    Thank you.       CALLED AND INFORMED PT. MA    and “Your Medication list” sections of this After Visit Summary. Gene Chaves